# Patient Record
(demographics unavailable — no encounter records)

---

## 2025-03-18 NOTE — PHYSICAL EXAM
[de-identified] : Extremity: +equinus (releases) R LE, +PPAV R foot, soft tissue swelling and associated tenderness anterolateral aspect R ankle > deltoid R ankle.  Nontender R distal fibula, peroneals, syndesmosis, Achilles, medial malleolus, hindfoot ST, midfoot LF and PTT insertional, and forefoot / base 5th metatarsal.  Calves soft and nontender, sensorimotor unchanged, +laxity Drawer testing R ankle / generalized ligamentous laxity, skin intact R LE.  AOx3, mood / affect normal. [de-identified] : ACC: [id number] EXAM: XR ANKLE COMP MIN 3 VIEWS RT ORDERED BY: SONJA SHELTON ACC: [id number] EXAM: XR FOOT COMP MIN 3 VIEWS RT ORDERED BY: SONJA SHELTON PROCEDURE DATE: 03/14/2025 INTERPRETATION: 3 views of the right ankle 3 views of the right foot  INDICATION: Pain.  IMPRESSION:  3 mm mineral density along the medial malleolus suggestive of avulsion fracture, age indeterminate. The joint spaces are preserved. The ankle mortise is preserved. There is mild soft tissue edema about the ankle.  --- End of Report --- ESSIE KNIGHT MD; Attending Radiologist This document has been electronically signed. Mar 14 2025 4:11PM  Radiographs (3v R ankle and 2v R foot) reveal STS R ankle, age-indeterminate avulsion injury MM tip R ankle, PTS / Souza's R foot.

## 2025-03-18 NOTE — HISTORY OF PRESENT ILLNESS
[A CAM Boot] : a CAM boot [FreeTextEntry1] :  Ms. NUSRAT LANDIS, 23 year female presents to office for evaluation of right ankle injury. On 3/15/2025, states she injured her ankle getting off the bus. She went to [facility] ED and was told she had a fracture. She was placed in a CAM boot and using BL crutches. Pain is located lateral and medial aspect of her ankle. Also notes feeling as if she is walking "tita". Aggravating factors include pressure and weight bearing. Patient denies use of pain medication. Denies previous injury/surgery on affected RLE. Admits to mild numbness and tingling. Denies additional musculoskeletal complaints referable to ankle/foot.  PMH: Denies DM and Rheumatoid disease. HRB:  Denies smoking tobacco.

## 2025-03-18 NOTE — DISCUSSION/SUMMARY
[de-identified] : Discussed with patient nature of condition, potential course / sequelae, options reviewed.  CAM boot immobilization and ambulation with transition to NB shoe wear / ankle brace as tolerated, activity modification, physical therapy / rehabilitation and associated modalities, HEP.  Return to office in 6 - 8 weeks / PRN, all questions answered.

## 2025-03-27 NOTE — HISTORY OF PRESENT ILLNESS
[Home] : at home, [unfilled] , at the time of the visit. [Other Location: e.g. Home (Enter Location, City,State)___] : at [unfilled] [Telehealth (audio & video)] : This visit was provided via telehealth using real-time 2-way audio visual technology. [Verbal consent obtained from patient] : the patient, [unfilled] [FreeTextEntry1] : Patient presents for weight loss and overweight/obese comorbidity management  has been undergoing bariatric workup was taking phen tpm last visit and now would like to resume and wondering about GLP-1 coverage as bariatric workup is ongoing

## 2025-03-27 NOTE — ASSESSMENT
[FreeTextEntry1] : Bariatric surgery history: n Overweight / obesity comorbidities: n Current anti-obesity medications: n Obesity medication side effects: n  restart phen tpm will check on zpb coverage, father is in healthcare so GHI may be different follow up in 2 mo if covered, otherwise 6 mo or sooner prn